# Patient Record
Sex: FEMALE | Race: WHITE | Employment: FULL TIME | URBAN - METROPOLITAN AREA
[De-identification: names, ages, dates, MRNs, and addresses within clinical notes are randomized per-mention and may not be internally consistent; named-entity substitution may affect disease eponyms.]

---

## 2023-02-08 ENCOUNTER — HOSPITAL ENCOUNTER (EMERGENCY)
Age: 48
Discharge: LEFT BEFORE DISCHARGE | End: 2023-02-08
Payer: MEDICAID

## 2023-02-08 ENCOUNTER — APPOINTMENT (OUTPATIENT)
Dept: CT IMAGING | Age: 48
End: 2023-02-08
Payer: MEDICAID

## 2023-02-08 ENCOUNTER — APPOINTMENT (OUTPATIENT)
Dept: GENERAL RADIOLOGY | Age: 48
End: 2023-02-08
Payer: MEDICAID

## 2023-02-08 VITALS
SYSTOLIC BLOOD PRESSURE: 120 MMHG | HEART RATE: 95 BPM | TEMPERATURE: 98.8 F | WEIGHT: 140 LBS | HEIGHT: 63 IN | BODY MASS INDEX: 24.8 KG/M2 | RESPIRATION RATE: 16 BRPM | DIASTOLIC BLOOD PRESSURE: 92 MMHG | OXYGEN SATURATION: 99 %

## 2023-02-08 DIAGNOSIS — I16.0 HYPERTENSIVE URGENCY: Primary | ICD-10-CM

## 2023-02-08 DIAGNOSIS — R07.9 CHEST PAIN, UNSPECIFIED TYPE: ICD-10-CM

## 2023-02-08 DIAGNOSIS — R29.898 LEFT ARM WEAKNESS: ICD-10-CM

## 2023-02-08 LAB
ALBUMIN SERPL-MCNC: 4.3 G/DL (ref 3.5–4.6)
ALP BLD-CCNC: 103 U/L (ref 40–130)
ALT SERPL-CCNC: 15 U/L (ref 0–33)
ANION GAP SERPL CALCULATED.3IONS-SCNC: 11 MEQ/L (ref 9–15)
APTT: 31.6 SEC (ref 24.4–36.8)
AST SERPL-CCNC: 17 U/L (ref 0–35)
BASOPHILS ABSOLUTE: 0.1 K/UL (ref 0–0.2)
BASOPHILS RELATIVE PERCENT: 0.5 %
BILIRUB SERPL-MCNC: <0.2 MG/DL (ref 0.2–0.7)
BILIRUBIN URINE: NEGATIVE
BLOOD, URINE: NEGATIVE
BUN BLDV-MCNC: 13 MG/DL (ref 6–20)
CALCIUM SERPL-MCNC: 9.8 MG/DL (ref 8.5–9.9)
CHLORIDE BLD-SCNC: 97 MEQ/L (ref 95–107)
CLARITY: CLEAR
CO2: 26 MEQ/L (ref 20–31)
COLOR: YELLOW
CREAT SERPL-MCNC: 0.74 MG/DL (ref 0.5–0.9)
EOSINOPHILS ABSOLUTE: 0.1 K/UL (ref 0–0.7)
EOSINOPHILS RELATIVE PERCENT: 1 %
GFR SERPL CREATININE-BSD FRML MDRD: >60 ML/MIN/{1.73_M2}
GLOBULIN: 3 G/DL (ref 2.3–3.5)
GLUCOSE BLD-MCNC: 101 MG/DL (ref 70–99)
GLUCOSE URINE: NEGATIVE MG/DL
HCG, URINE, POC: NEGATIVE
HCT VFR BLD CALC: 37.8 % (ref 37–47)
HEMOGLOBIN: 12.8 G/DL (ref 12–16)
KETONES, URINE: NEGATIVE MG/DL
LEUKOCYTE ESTERASE, URINE: NEGATIVE
LYMPHOCYTES ABSOLUTE: 3.5 K/UL (ref 1–4.8)
LYMPHOCYTES RELATIVE PERCENT: 28.9 %
Lab: NORMAL
MAGNESIUM: 2 MG/DL (ref 1.7–2.4)
MCH RBC QN AUTO: 31.1 PG (ref 27–31.3)
MCHC RBC AUTO-ENTMCNC: 34 % (ref 33–37)
MCV RBC AUTO: 91.4 FL (ref 79.4–94.8)
MONOCYTES ABSOLUTE: 0.9 K/UL (ref 0.2–0.8)
MONOCYTES RELATIVE PERCENT: 7.4 %
NEGATIVE QC PASS/FAIL: NORMAL
NEUTROPHILS ABSOLUTE: 7.6 K/UL (ref 1.4–6.5)
NEUTROPHILS RELATIVE PERCENT: 62.2 %
NITRITE, URINE: NEGATIVE
PDW BLD-RTO: 13.2 % (ref 11.5–14.5)
PH UA: 7 (ref 5–9)
PLATELET # BLD: 339 K/UL (ref 130–400)
POC CREATININE WHOLE BLOOD: 0.8
POSITIVE QC PASS/FAIL: NORMAL
POTASSIUM SERPL-SCNC: 3.4 MEQ/L (ref 3.4–4.9)
PROTEIN UA: NEGATIVE MG/DL
RBC # BLD: 4.13 M/UL (ref 4.2–5.4)
SODIUM BLD-SCNC: 134 MEQ/L (ref 135–144)
SPECIFIC GRAVITY UA: 1.01 (ref 1–1.03)
TOTAL CK: 85 U/L (ref 0–170)
TOTAL PROTEIN: 7.3 G/DL (ref 6.3–8)
TROPONIN: <0.01 NG/ML (ref 0–0.01)
TSH REFLEX: 1.48 UIU/ML (ref 0.44–3.86)
URINE REFLEX TO CULTURE: NORMAL
UROBILINOGEN, URINE: 0.2 E.U./DL
WBC # BLD: 12.2 K/UL (ref 4.8–10.8)

## 2023-02-08 PROCEDURE — 70498 CT ANGIOGRAPHY NECK: CPT

## 2023-02-08 PROCEDURE — 81003 URINALYSIS AUTO W/O SCOPE: CPT

## 2023-02-08 PROCEDURE — 70496 CT ANGIOGRAPHY HEAD: CPT

## 2023-02-08 PROCEDURE — 85730 THROMBOPLASTIN TIME PARTIAL: CPT

## 2023-02-08 PROCEDURE — 84484 ASSAY OF TROPONIN QUANT: CPT

## 2023-02-08 PROCEDURE — 80053 COMPREHEN METABOLIC PANEL: CPT

## 2023-02-08 PROCEDURE — 6360000002 HC RX W HCPCS: Performed by: PERSONAL EMERGENCY RESPONSE ATTENDANT

## 2023-02-08 PROCEDURE — 82550 ASSAY OF CK (CPK): CPT

## 2023-02-08 PROCEDURE — 99285 EMERGENCY DEPT VISIT HI MDM: CPT

## 2023-02-08 PROCEDURE — 85025 COMPLETE CBC W/AUTO DIFF WBC: CPT

## 2023-02-08 PROCEDURE — 6360000004 HC RX CONTRAST MEDICATION: Performed by: PERSONAL EMERGENCY RESPONSE ATTENDANT

## 2023-02-08 PROCEDURE — 36415 COLL VENOUS BLD VENIPUNCTURE: CPT

## 2023-02-08 PROCEDURE — 83735 ASSAY OF MAGNESIUM: CPT

## 2023-02-08 PROCEDURE — 93005 ELECTROCARDIOGRAM TRACING: CPT | Performed by: EMERGENCY MEDICINE

## 2023-02-08 PROCEDURE — 71045 X-RAY EXAM CHEST 1 VIEW: CPT

## 2023-02-08 PROCEDURE — 2500000003 HC RX 250 WO HCPCS: Performed by: PERSONAL EMERGENCY RESPONSE ATTENDANT

## 2023-02-08 PROCEDURE — 96374 THER/PROPH/DIAG INJ IV PUSH: CPT

## 2023-02-08 PROCEDURE — 84443 ASSAY THYROID STIM HORMONE: CPT

## 2023-02-08 RX ORDER — KETOROLAC TROMETHAMINE 30 MG/ML
30 INJECTION, SOLUTION INTRAMUSCULAR; INTRAVENOUS ONCE
Status: DISCONTINUED | OUTPATIENT
Start: 2023-02-08 | End: 2023-02-09 | Stop reason: HOSPADM

## 2023-02-08 RX ORDER — LABETALOL HYDROCHLORIDE 5 MG/ML
10 INJECTION, SOLUTION INTRAVENOUS ONCE
Status: COMPLETED | OUTPATIENT
Start: 2023-02-08 | End: 2023-02-08

## 2023-02-08 RX ADMIN — LABETALOL HYDROCHLORIDE 10 MG: 5 INJECTION, SOLUTION INTRAVENOUS at 20:41

## 2023-02-08 RX ADMIN — IOPAMIDOL 75 ML: 612 INJECTION, SOLUTION INTRAVENOUS at 21:55

## 2023-02-08 ASSESSMENT — ENCOUNTER SYMPTOMS
SHORTNESS OF BREATH: 0
VOMITING: 0
BLOOD IN STOOL: 0
COLOR CHANGE: 0
SORE THROAT: 0
COUGH: 0
NAUSEA: 0
DIARRHEA: 0
RHINORRHEA: 0
ABDOMINAL PAIN: 0

## 2023-02-08 ASSESSMENT — PAIN - FUNCTIONAL ASSESSMENT: PAIN_FUNCTIONAL_ASSESSMENT: NONE - DENIES PAIN

## 2023-02-09 LAB
EKG ATRIAL RATE: 84 BPM
EKG P AXIS: 45 DEGREES
EKG P-R INTERVAL: 136 MS
EKG Q-T INTERVAL: 366 MS
EKG QRS DURATION: 84 MS
EKG QTC CALCULATION (BAZETT): 432 MS
EKG R AXIS: 43 DEGREES
EKG T AXIS: 29 DEGREES
EKG VENTRICULAR RATE: 84 BPM
GFR SERPL CREATININE-BSD FRML MDRD: >60 ML/MIN/{1.73_M2}
PERFORMED ON: NORMAL
POC CREATININE: 0.8 MG/DL (ref 0.6–1.2)
POC SAMPLE TYPE: NORMAL

## 2023-02-09 PROCEDURE — 93010 ELECTROCARDIOGRAM REPORT: CPT | Performed by: INTERNAL MEDICINE

## 2023-02-09 NOTE — ED PROVIDER NOTES
[facility] ED  eMERGENCY dEPARTMENT eNCOUnter      Pt Name: Gina Holden  MRN: [id number]  Birthdate 1975  Date of evaluation: 2/8/2023  Provider: JEAN DAVIDSON      HISTORY OF PRESENT ILLNESS    Gina Holden is a 47 y.o. female with PMHx of HTN presents to the emergency department with left arm weakness and chest pain.  Patient states 1 hour ago after doing weightlifting as chest day, she started with left arm weakness, paresthesias throughout, heart racing, and left anterior chest pain.  Chest pain has been coming and going, no aggravating factors.  Lightheadedness.  She states this does not feel like a muscular or excessive exercise weakness concerning her arm.  Patient did take losartan potassium around 7:20 PM but paresthesias and weakness seem to worsen and she became concerned and came to the emergency room.  Does complain of some left ear tinnitus and pounding sensation for the past 3 days. she has had headache for years, saw family doctor and given magnesium which she has not been taking.  For 1 year she has been getting intermittent left arm paresthesias but has never seen  Concerning it.  She was diagnosed with hypertension last year in July/August and given losartan but trying to maintain her BP with diet and exercise, today was the first time she took the medicine.  She denies fevers, change in vision, cough shortness of breath, abdominal pain, nausea, vomiting, diarrhea, urinary symptoms.  No drugs or alcohol, no smoking.   at bedside denies any confusion, facial asymmetry, slurred speech.  No significant family history of cardiac or CVA    HPI    Nursing Notes were reviewed.    REVIEW OF SYSTEMS       Review of Systems   Constitutional:  Negative for appetite change, chills and fever.   HENT:  Negative for congestion, rhinorrhea and sore throat.    Respiratory:  Negative for cough and shortness of breath.    Cardiovascular:  Positive for chest pain.  Gastrointestinal:  Negative for abdominal pain, blood in stool, diarrhea, nausea and vomiting. Genitourinary:  Negative for difficulty urinating. Musculoskeletal:  Negative for neck stiffness. Skin:  Negative for color change and rash. Neurological:  Positive for weakness, light-headedness, numbness and headaches. Negative for dizziness and syncope. All other systems reviewed and are negative. PAST MEDICAL HISTORY   History reviewed. No pertinent past medical history. SURGICAL HISTORY     History reviewed. No pertinent surgical history. CURRENT MEDICATIONS       Previous Medications    No medications on file       ALLERGIES     Patient has no known allergies. FAMILY HISTORY     History reviewed. No pertinent family history. SOCIAL HISTORY       Social History     Socioeconomic History    Marital status:      Spouse name: None    Number of children: None    Years of education: None    Highest education level: None   Tobacco Use    Smoking status: Never    Smokeless tobacco: Never   Substance and Sexual Activity    Alcohol use: Not Currently    Drug use: Never         PHYSICAL EXAM         ED Triage Vitals [02/08/23 1956]   BP Temp Temp Source Heart Rate Resp SpO2 Height Weight   (!) 165/101 98.8 °F (37.1 °C) Oral 95 16 98 % 5' 3\" (1.6 m) 140 lb (63.5 kg)       Physical Exam  Constitutional:       Appearance: She is well-developed. HENT:      Head: Normocephalic and atraumatic. Eyes:      Conjunctiva/sclera: Conjunctivae normal.      Pupils: Pupils are equal, round, and reactive to light. Neck:      Trachea: No tracheal deviation. Cardiovascular:      Heart sounds: Normal heart sounds. Pulmonary:      Effort: Pulmonary effort is normal. No respiratory distress. Breath sounds: Normal breath sounds. No stridor. Chest:      Chest wall: No tenderness. Abdominal:      General: Bowel sounds are normal. There is no distension. Palpations: Abdomen is soft. There is no mass. Tenderness: There is no abdominal tenderness. There is no guarding or rebound. Musculoskeletal:         General: Normal range of motion. Cervical back: Normal range of motion and neck supple. Skin:     General: Skin is warm and dry. Capillary Refill: Capillary refill takes less than 2 seconds. Findings: No rash. Neurological:      Mental Status: She is alert and oriented to person, place, and time. Deep Tendon Reflexes: Reflexes are normal and symmetric. Comments: Mild weakness to left upper extremity, no arm drift but hand does shake   Psychiatric:         Behavior: Behavior normal.         Thought Content: Thought content normal.         Judgment: Judgment normal.       DIAGNOSTIC RESULTS     EKG:All EKG's are interpreted by the Emergency Department Physician who either signs or Co-signs this chart in the absence of a cardiologist.    Normal sinus rhythm, rate 84, normal intervals, normal axis, no ST segment changes    RADIOLOGY:   Non-plain film images such as CT, Ultrasound and MRI are read by theradiologist. Plain radiographic images are visualized and preliminarily interpreted by the emergency physician with the below findings:    Interpretation per theRadiologist below, if available at the time of this note:    XR CHEST PORTABLE   Final Result   No pneumonia or pleural effusion.          CTA HEAD W WO CONTRAST    (Results Pending)   CTA NECK W WO CONTRAST    (Results Pending)           LABS:  Labs Reviewed   CBC WITH AUTO DIFFERENTIAL - Abnormal; Notable for the following components:       Result Value    WBC 12.2 (*)     RBC 4.13 (*)     Neutrophils Absolute 7.6 (*)     Monocytes Absolute 0.9 (*)     All other components within normal limits   COMPREHENSIVE METABOLIC PANEL - Abnormal; Notable for the following components:    Sodium 134 (*)     Glucose 101 (*)     All other components within normal limits   POCT CREATININE - Normal   CK   APTT   TROPONIN URINALYSIS WITH REFLEX TO CULTURE   MAGNESIUM   TSH WITH REFLEX   POC PREGNANCY UR-QUAL       All other labs were within normal range or not returned as of this dictation. EMERGENCY DEPARTMENT COURSE and DIFFERENTIAL DIAGNOSIS/MDM:   Vitals:    Vitals:    02/08/23 1956 02/08/23 2232 02/08/23 2247   BP: (!) 165/101 131/82 (!) 120/92   Pulse: 95     Resp: 16     Temp: 98.8 °F (37.1 °C)     TempSrc: Oral     SpO2: 98% 97% 99%   Weight: 140 lb (63.5 kg)     Height: 5' 3\" (1.6 m)           MDM    Patient presents with left anterior chest pain and left arm weakness/paresthesias after working out 1 hour ago. Radiologist interpreting:  CTA head shows no acute process  CTA neck shows no acute process  Chest x-ray shows no acute process    Sodium 134, WBC 12.2    Patient given Toradol and labetalol IV    On reassessment, patient symptoms have resolved and blood pressure has decreased. strong strength throughout all extremities, no objective sensory changes, no shakiness with arm drift test.  No chest pain as well. Most likely hypertensive urgency today. I told patient I would like to keep her in the hospital for TIA rule out and she has refused. She states that she feels better she would like to go home and she will follow-up with family doctor. She was made aware to monitor blood pressure at home and if blood pressure is 130/85 or greater she is to start her losartan. She was also made aware to begin aspirin 81 mg at home until she follows up with family doctor. Again patient is refusing hospital admission, leaving AMA, alert and oriented and competent to make decisions. Standard anticipatory guidance given to patient upon discharge. Have given them a specific time frame in which to follow-up and who to follow-up with. I have also advised them that they should return to the emergency department if they get worse, or not getting better or develop any new or concerning symptoms.  Patient demonstrates understanding. CRITICAL CARE TIME   Total Critical Caretime was 0 minutes, excluding separately reportable procedures. There was a high probability of clinically significant/life threatening deterioration in the patient's condition which required my urgent intervention. Procedures    FINAL IMPRESSION      1. Hypertensive urgency    2. Chest pain, unspecified type    3. Left arm weakness          DISPOSITION/PLAN   DISPOSITION Decision To Discharge 02/08/2023 10:58:19 PM      PATIENT REFERRED TO:  Venus Talbot, [address] [facility],[address], [address]  [address]  [phone]    Call       DISCHARGE MEDICATIONS:  New Prescriptions    No medications on file          (Please notethat portions of this note were completed with a voice recognition program.  Efforts were made to edit the dictations but occasionally words are mis-transcribed. )    Gearldean Castleman, PA (electronically signed)  Emergency Physician Assistant         Zunilda Duncanma  02/08/23 [id number]

## 2023-02-09 NOTE — ED TRIAGE NOTES
Pt to ED due to numbness in left arm x1 hr. Pt states she has also had hypertension for a few months and started to take medication today for it. Pt has equal  strength in both arms. No facial droop noted. No slurred speech. Pt is alert and oriented x4. Skin is warm, dry, intact. Pt also states she feels like her heart has been racing. Pt states numbness started around 1900.

## 2023-02-09 NOTE — ED NOTES
Discharge instructions, medications and follow up care reviewed. Pt verbalized understanding. Pt A&Ox4. Afebrile with skin warm and dry. Respirations and breathing equal and unlabored. Pt stable at time of departure.         Joo Rodrigues RN  02/08/23 [id number]

## 2024-09-19 ENCOUNTER — HOSPITAL ENCOUNTER (EMERGENCY)
Age: 49
Discharge: HOME | End: 2024-09-19
Payer: MEDICAID

## 2024-09-19 ENCOUNTER — APPOINTMENT (OUTPATIENT)
Dept: GENERAL RADIOLOGY | Age: 49
End: 2024-09-19
Payer: MEDICAID

## 2024-09-19 VITALS
SYSTOLIC BLOOD PRESSURE: 174 MMHG | WEIGHT: 130 LBS | RESPIRATION RATE: 16 BRPM | HEIGHT: 63 IN | DIASTOLIC BLOOD PRESSURE: 96 MMHG | HEART RATE: 81 BPM | OXYGEN SATURATION: 98 % | BODY MASS INDEX: 23.04 KG/M2 | TEMPERATURE: 98.8 F

## 2024-09-19 DIAGNOSIS — S86.911A STRAIN OF RIGHT KNEE, INITIAL ENCOUNTER: Primary | ICD-10-CM

## 2024-09-19 PROCEDURE — 73560 X-RAY EXAM OF KNEE 1 OR 2: CPT

## 2024-09-19 PROCEDURE — 99283 EMERGENCY DEPT VISIT LOW MDM: CPT

## 2024-09-19 RX ORDER — LOSARTAN POTASSIUM AND HYDROCHLOROTHIAZIDE 12.5; 5 MG/1; MG/1
1 TABLET ORAL DAILY
Qty: 30 TABLET | Refills: 5 | Status: SHIPPED | OUTPATIENT
Start: 2024-09-19

## 2024-09-19 RX ORDER — NAPROXEN 500 MG/1
500 TABLET ORAL 2 TIMES DAILY
Qty: 30 TABLET | Refills: 0 | Status: SHIPPED | OUTPATIENT
Start: 2024-09-19

## 2024-09-19 ASSESSMENT — PAIN SCALES - GENERAL: PAINLEVEL_OUTOF10: 10

## 2024-09-19 ASSESSMENT — PAIN DESCRIPTION - FREQUENCY: FREQUENCY: CONTINUOUS

## 2024-09-19 ASSESSMENT — ENCOUNTER SYMPTOMS
RESPIRATORY NEGATIVE: 1
EYES NEGATIVE: 1
GASTROINTESTINAL NEGATIVE: 1

## 2024-09-19 ASSESSMENT — PAIN - FUNCTIONAL ASSESSMENT: PAIN_FUNCTIONAL_ASSESSMENT: 0-10

## 2024-09-19 ASSESSMENT — PAIN DESCRIPTION - PAIN TYPE: TYPE: ACUTE PAIN

## 2024-09-19 ASSESSMENT — LIFESTYLE VARIABLES
HOW OFTEN DO YOU HAVE A DRINK CONTAINING ALCOHOL: MONTHLY OR LESS
HOW MANY STANDARD DRINKS CONTAINING ALCOHOL DO YOU HAVE ON A TYPICAL DAY: 1 OR 2

## 2024-10-03 ENCOUNTER — HOSPITAL ENCOUNTER (OUTPATIENT)
Dept: ORTHOPEDIC SURGERY | Age: 49
Discharge: HOME | End: 2024-10-05
Payer: MEDICAID

## 2024-10-03 ENCOUNTER — OFFICE VISIT (OUTPATIENT)
Dept: ORTHOPEDIC SURGERY | Age: 49
End: 2024-10-03

## 2024-10-03 VITALS
HEART RATE: 81 BPM | TEMPERATURE: 98.3 F | WEIGHT: 130 LBS | OXYGEN SATURATION: 99 % | HEIGHT: 63 IN | BODY MASS INDEX: 23.04 KG/M2

## 2024-10-03 DIAGNOSIS — M25.561 CHRONIC PAIN OF BOTH KNEES: ICD-10-CM

## 2024-10-03 DIAGNOSIS — G89.29 CHRONIC PAIN OF BOTH KNEES: ICD-10-CM

## 2024-10-03 DIAGNOSIS — M25.562 CHRONIC PAIN OF BOTH KNEES: ICD-10-CM

## 2024-10-03 DIAGNOSIS — M17.0 PRIMARY OSTEOARTHRITIS OF BOTH KNEES: Primary | ICD-10-CM

## 2024-10-03 PROCEDURE — 73564 X-RAY EXAM KNEE 4 OR MORE: CPT

## 2024-10-03 RX ORDER — LIDOCAINE HYDROCHLORIDE 10 MG/ML
8 INJECTION, SOLUTION INFILTRATION; PERINEURAL ONCE
Status: COMPLETED | OUTPATIENT
Start: 2024-10-03 | End: 2024-10-03

## 2024-10-03 RX ORDER — TRIAMCINOLONE ACETONIDE 40 MG/ML
80 INJECTION, SUSPENSION INTRA-ARTICULAR; INTRAMUSCULAR ONCE
Status: COMPLETED | OUTPATIENT
Start: 2024-10-03 | End: 2024-10-03

## 2024-10-03 RX ADMIN — LIDOCAINE HYDROCHLORIDE 8 ML: 10 INJECTION, SOLUTION INFILTRATION; PERINEURAL at 11:47

## 2024-10-03 RX ADMIN — TRIAMCINOLONE ACETONIDE 80 MG: 40 INJECTION, SUSPENSION INTRA-ARTICULAR; INTRAMUSCULAR at 11:47

## 2024-10-03 RX ADMIN — LIDOCAINE HYDROCHLORIDE 8 ML: 10 INJECTION, SOLUTION INFILTRATION; PERINEURAL at 11:48

## 2024-10-03 RX ADMIN — TRIAMCINOLONE ACETONIDE 80 MG: 40 INJECTION, SUSPENSION INTRA-ARTICULAR; INTRAMUSCULAR at 11:48

## 2024-10-03 NOTE — PROGRESS NOTES
XR KNEE RIGHT (MIN 4 VIEWS)    Result Date: 10/3/2024  EXAMINATION: FOUR XRAY VIEWS OF THE RIGHT KNEE; FOUR XRAY VIEWS OF THE LEFT KNEE 10/3/2024 10:33 am COMPARISON: Right knee radiographs 09/19/2024. HISTORY: ORDERING SYSTEM PROVIDED HISTORY: Chronic pain of both knees, Chronic pain of both knees, Chronic pain of both knees TECHNOLOGIST PROVIDED HISTORY: Reason for exam:->Knee pain.  AP standing both knees, notch view both knees, lateral knee, merchant both knees Is the patient pregnant?->No What reading provider will be dictating this exam?->CRC FINDINGS: No acute fracture is seen. No evidence of dislocation.  Mild bilateral tricompartmental osteoarthrosis.  Small volume bilateral knee joint fluid.     Mild bilateral tricompartmental osteoarthrosis.     XR KNEE RIGHT (1-2 VIEWS)    Result Date: 9/19/2024  EXAMINATION: TWO XRAY VIEWS OF THE RIGHT KNEE 9/19/2024 1:29 pm COMPARISON: None. HISTORY: ORDERING SYSTEM PROVIDED HISTORY: pain TECHNOLOGIST PROVIDED HISTORY: Reason for exam:->pain Is the patient pregnant?->No What reading provider will be dictating this exam?->CRC FINDINGS: Three views of the RIGHT knee are submitted. The joint spaces intact. No significant effusion. There is a well corticated bony density at the level of the lateral tibial plateau measuring approximately the 9.6 mm. No dislocation. No acute fracture     1. No acute fracture or dislocation. 2. Well Corticated bony density at the level of the lateral tibial plateau. May represent calcification in the lateral collateral ligament and possible associated calcific tendinitis.  Correlate clinically and with point tenderness          Assessment / Plan:      Diagnosis Orders   1. Primary osteoarthritis of both knees  lidocaine 1 % injection 8 mL    triamcinolone acetonide (KENALOG-40) injection 80 mg    lidocaine 1 % injection 8 mL    triamcinolone acetonide (KENALOG-40) injection 80 mg    DRAIN/INJECT LARGE JOINT/BURSA      2. Chronic pain of

## 2024-11-12 ENCOUNTER — APPOINTMENT (OUTPATIENT)
Dept: GENERAL RADIOLOGY | Age: 49
End: 2024-11-12
Payer: MEDICAID

## 2024-11-12 ENCOUNTER — HOSPITAL ENCOUNTER (EMERGENCY)
Age: 49
Discharge: HOME | End: 2024-11-12
Attending: STUDENT IN AN ORGANIZED HEALTH CARE EDUCATION/TRAINING PROGRAM
Payer: MEDICAID

## 2024-11-12 VITALS
RESPIRATION RATE: 11 BRPM | WEIGHT: 135 LBS | TEMPERATURE: 97.8 F | SYSTOLIC BLOOD PRESSURE: 140 MMHG | HEART RATE: 77 BPM | OXYGEN SATURATION: 98 % | DIASTOLIC BLOOD PRESSURE: 78 MMHG | HEIGHT: 63 IN | BODY MASS INDEX: 23.92 KG/M2

## 2024-11-12 DIAGNOSIS — R42 LIGHTHEADEDNESS: ICD-10-CM

## 2024-11-12 DIAGNOSIS — R42 DIZZINESS: Primary | ICD-10-CM

## 2024-11-12 LAB
ALBUMIN SERPL-MCNC: 3.8 G/DL (ref 3.5–4.6)
ALP SERPL-CCNC: 86 U/L (ref 40–130)
ALT SERPL-CCNC: 11 U/L (ref 0–33)
ANION GAP SERPL CALCULATED.3IONS-SCNC: 11 MEQ/L (ref 9–15)
AST SERPL-CCNC: 18 U/L (ref 0–35)
BASOPHILS # BLD: 0 K/UL (ref 0–0.2)
BASOPHILS NFR BLD: 0.2 %
BILIRUB SERPL-MCNC: 0.3 MG/DL (ref 0.2–0.7)
BUN SERPL-MCNC: 12 MG/DL (ref 6–20)
CALCIUM SERPL-MCNC: 8.9 MG/DL (ref 8.5–9.9)
CHLORIDE SERPL-SCNC: 98 MEQ/L (ref 95–107)
CO2 SERPL-SCNC: 25 MEQ/L (ref 20–31)
CREAT SERPL-MCNC: 0.75 MG/DL (ref 0.5–0.9)
EOSINOPHIL # BLD: 0 K/UL (ref 0–0.7)
EOSINOPHIL NFR BLD: 0.2 %
ERYTHROCYTE [DISTWIDTH] IN BLOOD BY AUTOMATED COUNT: 13.7 % (ref 11.5–14.5)
GLOBULIN SER CALC-MCNC: 2.9 G/DL (ref 2.3–3.5)
GLUCOSE SERPL-MCNC: 152 MG/DL (ref 70–99)
HCT VFR BLD AUTO: 36.1 % (ref 37–47)
HGB BLD-MCNC: 12.4 G/DL (ref 12–16)
LYMPHOCYTES # BLD: 1.4 K/UL (ref 1–4.8)
LYMPHOCYTES NFR BLD: 10.4 %
MCH RBC QN AUTO: 31.7 PG (ref 27–31.3)
MCHC RBC AUTO-ENTMCNC: 34.3 % (ref 33–37)
MCV RBC AUTO: 92.3 FL (ref 79.4–94.8)
MONOCYTES # BLD: 0.7 K/UL (ref 0.2–0.8)
MONOCYTES NFR BLD: 5.5 %
NEUTROPHILS # BLD: 10.8 K/UL (ref 1.4–6.5)
NEUTS SEG NFR BLD: 83.2 %
PLATELET # BLD AUTO: 303 K/UL (ref 130–400)
POTASSIUM SERPL-SCNC: 4 MEQ/L (ref 3.4–4.9)
PROT SERPL-MCNC: 6.7 G/DL (ref 6.3–8)
RBC # BLD AUTO: 3.91 M/UL (ref 4.2–5.4)
SODIUM SERPL-SCNC: 134 MEQ/L (ref 135–144)
TROPONIN, HIGH SENSITIVITY: <6 NG/L (ref 0–19)
TROPONIN, HIGH SENSITIVITY: <6 NG/L (ref 0–19)
WBC # BLD AUTO: 13 K/UL (ref 4.8–10.8)

## 2024-11-12 PROCEDURE — 71045 X-RAY EXAM CHEST 1 VIEW: CPT

## 2024-11-12 PROCEDURE — 80053 COMPREHEN METABOLIC PANEL: CPT

## 2024-11-12 PROCEDURE — 93005 ELECTROCARDIOGRAM TRACING: CPT | Performed by: STUDENT IN AN ORGANIZED HEALTH CARE EDUCATION/TRAINING PROGRAM

## 2024-11-12 PROCEDURE — 6370000000 HC RX 637 (ALT 250 FOR IP): Performed by: STUDENT IN AN ORGANIZED HEALTH CARE EDUCATION/TRAINING PROGRAM

## 2024-11-12 PROCEDURE — 99285 EMERGENCY DEPT VISIT HI MDM: CPT

## 2024-11-12 PROCEDURE — 85025 COMPLETE CBC W/AUTO DIFF WBC: CPT

## 2024-11-12 PROCEDURE — 84484 ASSAY OF TROPONIN QUANT: CPT

## 2024-11-12 RX ORDER — CETIRIZINE HYDROCHLORIDE 10 MG/1
10 TABLET ORAL ONCE
Status: COMPLETED | OUTPATIENT
Start: 2024-11-12 | End: 2024-11-12

## 2024-11-12 RX ORDER — MECLIZINE HYDROCHLORIDE 25 MG/1
25 TABLET ORAL ONCE
Status: COMPLETED | OUTPATIENT
Start: 2024-11-12 | End: 2024-11-12

## 2024-11-12 RX ORDER — MECLIZINE HYDROCHLORIDE 25 MG/1
25 TABLET ORAL 3 TIMES DAILY PRN
Qty: 21 TABLET | Refills: 0 | Status: SHIPPED | OUTPATIENT
Start: 2024-11-12 | End: 2024-11-19

## 2024-11-12 RX ADMIN — MECLIZINE HYDROCHLORIDE 25 MG: 25 TABLET ORAL at 11:41

## 2024-11-12 RX ADMIN — CETIRIZINE HYDROCHLORIDE 10 MG: 10 TABLET, FILM COATED ORAL at 11:42

## 2024-11-12 ASSESSMENT — PAIN - FUNCTIONAL ASSESSMENT: PAIN_FUNCTIONAL_ASSESSMENT: NONE - DENIES PAIN

## 2024-11-12 ASSESSMENT — LIFESTYLE VARIABLES
HOW OFTEN DO YOU HAVE A DRINK CONTAINING ALCOHOL: NEVER
HOW MANY STANDARD DRINKS CONTAINING ALCOHOL DO YOU HAVE ON A TYPICAL DAY: PATIENT DOES NOT DRINK

## 2024-11-12 NOTE — ED PROVIDER NOTES
[facility] ED  Emergency Department Encounter  Emergency Medicine      Pt Name: Gina Holden  MRN:[id number]  Birthdate 1975  Date of evaluation: 11/12/24  Time: 11:13 AM EST  PCP:  Rodri Cano DO    CHIEF COMPLAINT       Chief Complaint   Patient presents with    Dizziness     Pt c/o dizziness, n&v since 8am today       HISTORY OF PRESENT ILLNESS  (Location/Symptom, Timing/Onset, Context/Setting, Quality, Duration, ModifyingFactors, Severity.)      Gina Holden is a 49 y.o. female history of hypertension presents with dizziness and lightheadedness.  Started this morning.  She had woke up and was laying in bed and felt dizzy anytime she was moving her head.  She took her blood pressure medications.  She said later on as she was trying to wait for her  to come home, she was having some lightheadedness like she was going to faint and then had some upper chest pain.  She also is admitting to some tingling to the left arm.  Notes no history of strokes no falls did not actually lose consciousness.  She denies having a headache denies any double blurry vision no nausea or vomiting currently no sore throat no cough no ear pain or congestion.  She denies any fevers or chills denies any shortness of breath hemoptysis no abdominal pain no leg swelling or calf pain some tingling to the left upper arm but no actual numbness no weakness.  She denies smoking, alcohol, drug use.    PAST MEDICAL / SURGICAL / SOCIAL /FAMILY HISTORY      has a past medical history of Hypertension and TIA (transient ischemic attack).  No other pertinent PMH on review with patient/guardian.     has no past surgical history on file.  No other pertinent PSH on review with patient/guardian.  Social History     Socioeconomic History    Marital status:      Spouse name: Not on file    Number of children: Not on file    Years of education: Not on file    Highest education level: Not on file   Occupational History    Not

## 2024-11-12 NOTE — DISCHARGE INSTRUCTIONS
Try the meclizine every 8 hours as needed for dizziness    You can use Zyrtec or Claritin over-the-counter to help with congestion    Take the medication as indicated.  Get up slowly; dangle your feet over the bed before standing up, do not stand up quickly.     Sit upright.  Turn your head to the symptomatic side at a 45 degree angle, and lie on your back  Remain up to 5 minutes in this position.  Turn your head 90 degrees to the other side  Remain up to 5 minutes in this position.  Roll your body onto your side in the direction you are facing; now you are pointing your head nose down.  Remain up to 5 minutes in this position.  Go back to the sitting position and remain up to 30 seconds in this position.    The entire procedure should be repeated two more times, for a total of three times.    Return to the Emergency Department for any other care or concern.    PLEASE RETURN TO THE EMERGENCY DEPARTMENT IMMEDIATELY for worsening symptoms, worsening of sensation of room spinning, any headache, slurring of speech, change in vision / hearing / taste, ringing in your ears, loss of sensation or difficulty moving your arms or legs, excessive nausea or vomiting, or if you develop any concerning symptoms such as: high fever not relieved by acetaminophen (Tylenol) and/or ibuprofen (Motrin / Advil), chills, shortness of breath, chest pain, feeling of your heart fluttering or racing, persistent nausea and/or vomiting, vomiting up blood, blood in your stool, loss of consciousness, numbness, weakness or tingling in the arms or legs or change in color of the extremities, changes in mental status, persistent headache, blurry vision, loss of bladder / bowel control, unable to follow up with your physician, or other any other care or concern.    
EKG completed

## 2024-11-12 NOTE — ED TRIAGE NOTES
Pt c/o dizziness, near syncope feeling, n&v at 8am today and never went away, pt reports hx. TIA earlier this year. Pt a&ox4 on arrival, skin w/d/pink, pulses palp, pt reports tingling, weakness, and pressure in tongue, jaw and l upper ext. Speech clear, 0 vision changes reported, 0 pain.

## 2024-11-13 LAB
EKG ATRIAL RATE: 78 BPM
EKG P AXIS: 2 DEGREES
EKG P-R INTERVAL: 134 MS
EKG Q-T INTERVAL: 400 MS
EKG QRS DURATION: 80 MS
EKG QTC CALCULATION (BAZETT): 456 MS
EKG R AXIS: 14 DEGREES
EKG T AXIS: 12 DEGREES
EKG VENTRICULAR RATE: 78 BPM

## 2024-11-19 ENCOUNTER — OFFICE VISIT (OUTPATIENT)
Dept: ORTHOPEDIC SURGERY | Age: 49
End: 2024-11-19
Payer: MEDICAID

## 2024-11-19 VITALS
HEIGHT: 63 IN | WEIGHT: 135 LBS | BODY MASS INDEX: 23.92 KG/M2 | OXYGEN SATURATION: 96 % | TEMPERATURE: 97.4 F | HEART RATE: 84 BPM

## 2024-11-19 DIAGNOSIS — M17.0 PRIMARY OSTEOARTHRITIS OF BOTH KNEES: Primary | ICD-10-CM

## 2024-11-19 PROCEDURE — G8420 CALC BMI NORM PARAMETERS: HCPCS | Performed by: STUDENT IN AN ORGANIZED HEALTH CARE EDUCATION/TRAINING PROGRAM

## 2024-11-19 PROCEDURE — 1036F TOBACCO NON-USER: CPT | Performed by: STUDENT IN AN ORGANIZED HEALTH CARE EDUCATION/TRAINING PROGRAM

## 2024-11-19 PROCEDURE — G8427 DOCREV CUR MEDS BY ELIG CLIN: HCPCS | Performed by: STUDENT IN AN ORGANIZED HEALTH CARE EDUCATION/TRAINING PROGRAM

## 2024-11-19 PROCEDURE — G8484 FLU IMMUNIZE NO ADMIN: HCPCS | Performed by: STUDENT IN AN ORGANIZED HEALTH CARE EDUCATION/TRAINING PROGRAM

## 2024-11-19 PROCEDURE — 99213 OFFICE O/P EST LOW 20 MIN: CPT | Performed by: STUDENT IN AN ORGANIZED HEALTH CARE EDUCATION/TRAINING PROGRAM

## 2024-11-19 NOTE — PROGRESS NOTES
Subjective:      HPI:: Gina Holden is a 49 y.o. female who presents today for follow-up evaluation of both knees.  She had steroid injections at her last appointment and noted significant improvement.  She works as a  for [employer].  She still gets some discomfort mainly to the center anterior aspect of the knee worse with stairs and with kneeling.  She takes ibuprofen as well.  She is happy with her current status.    Past Medical History:   Diagnosis Date    Hypertension     TIA (transient ischemic attack)     2024     No past surgical history on file.  Social History     Socioeconomic History    Marital status:      Spouse name: Not on file    Number of children: Not on file    Years of education: Not on file    Highest education level: Not on file   Occupational History    Not on file   Tobacco Use    Smoking status: Never    Smokeless tobacco: Never   Vaping Use    Vaping status: Never Used   Substance and Sexual Activity    Alcohol use: Not Currently    Drug use: Never    Sexual activity: Not on file   Other Topics Concern    Not on file   Social History Narrative    Not on file     Social Determinants of Health     Financial Resource Strain: Not on file   Food Insecurity: Not on file   Transportation Needs: Not on file   Physical Activity: Not on file   Stress: Not on file   Social Connections: Not on file   Intimate Partner Violence: Not on file   Housing Stability: Not on file     No family history on file.  No Known Allergies  Current Outpatient Medications on File Prior to Visit   Medication Sig Dispense Refill    vitamin D3 (CHOLECALCIFEROL) 125 MCG (5000 UT) TABS tablet Take 1 tablet by mouth      meclizine (ANTIVERT) 25 MG tablet Take 1 tablet by mouth 3 times daily as needed for Dizziness 21 tablet 0    naproxen (NAPROSYN) 500 MG tablet Take 1 tablet by mouth 2 times daily 30 tablet 0    losartan-hydroCHLOROthiazide (HYZAAR) 50-12.5 MG per tablet Take 1 tablet by mouth daily 30